# Patient Record
Sex: MALE | Race: BLACK OR AFRICAN AMERICAN | NOT HISPANIC OR LATINO | Employment: OTHER | URBAN - METROPOLITAN AREA
[De-identification: names, ages, dates, MRNs, and addresses within clinical notes are randomized per-mention and may not be internally consistent; named-entity substitution may affect disease eponyms.]

---

## 2017-05-04 ENCOUNTER — HOSPITAL ENCOUNTER (EMERGENCY)
Facility: HOSPITAL | Age: 37
Discharge: HOME | End: 2017-05-04
Attending: EMERGENCY MEDICINE
Payer: MEDICAID

## 2017-05-04 VITALS
DIASTOLIC BLOOD PRESSURE: 76 MMHG | HEART RATE: 70 BPM | RESPIRATION RATE: 18 BRPM | SYSTOLIC BLOOD PRESSURE: 127 MMHG | OXYGEN SATURATION: 98 % | BODY MASS INDEX: 27.47 KG/M2 | HEIGHT: 67 IN | WEIGHT: 175 LBS | TEMPERATURE: 98 F

## 2017-05-04 DIAGNOSIS — S21.212A: Primary | ICD-10-CM
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
## 2017-05-04 DIAGNOSIS — T14.8XXA STAB WOUND: ICD-10-CM

## 2017-05-04 PROCEDURE — 25000003 PHARM REV CODE 250: Performed by: NURSE PRACTITIONER

## 2017-05-04 PROCEDURE — 99284 EMERGENCY DEPT VISIT MOD MDM: CPT

## 2017-05-04 RX ORDER — IBUPROFEN 600 MG/1
600 TABLET ORAL
Status: COMPLETED | OUTPATIENT
Start: 2017-05-04 | End: 2017-05-04

## 2017-05-04 RX ORDER — CEPHALEXIN 500 MG/1
500 CAPSULE ORAL 4 TIMES DAILY
Qty: 20 CAPSULE | Refills: 0 | Status: SHIPPED | OUTPATIENT
Start: 2017-05-04 | End: 2017-05-09

## 2017-05-04 RX ADMIN — IBUPROFEN 600 MG: 600 TABLET ORAL at 07:05
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
## 2017-05-05 NOTE — DISCHARGE INSTRUCTIONS
Old Laceration: Not Sutured  A laceration is a cut through the skin. This will usually require stitches if it is deep. However, if a laceration remains open for too long, the risk of infection increases. In your case, too much time has passed before coming for treatment. The danger of infection from suturing at this time is too high. That is why your wound was not sutured.  If the wound is spread open, it will heal by filling in from the bottom and sides. A wound that is not sutured may take 1 to 4 weeks to heal, depending on the size of the opening. A visible scar will probably occur. You can discuss revision of the scar with your healthcare provider at a later time.  Home care  The following guidelines will help you care for your laceration at home:  · Keep the wound clean and dry. If a bandage was applied and it becomes wet or dirty, replace it. Otherwise, leave it in place for the first 24 hours, then change it once a day or as directed.  · Clean the wound daily:  ¨ After removing any bandage, wash the area with soap and water. Use a wet cotton swab to loosen and remove any blood or crust that forms.  ¨ Talk with your doctor before applying any antibiotic ointment to the wound. Reapply a fresh bandage.  ¨ You may remove the bandage to shower as usual after the first 24 hours, but do not soak the area in water (no tub baths or swimming) for the next five days. Avoid activities that may reinjure your wound.  · The healthcare provider may prescribe an antibiotic cream or ointment to prevent infection. Do not stop using this medication until you have finished the prescribed course or the provider tells you to stop.  · The healthcare provider may also prescribe medications for pain. Follow instructions for taking these medications.  · Check the wound daily for signs of infection listed below.  Follow-up care  Follow up with your healthcare provider as advised.  When to seek medical advice  Call your healthcare  provider right away if any of these occur:  · Wound bleeding not controlled by direct pressure  · Signs of infection, including increasing pain in the wound, increasing wound redness or swelling, or pus or bad odor coming from the wound  · Fever of 100.4°F (38.ºC) or higher or as directed by your healthcare provider  · Wound edges re-open  · Wound changes colors  · Numbness around the wound   · Decreased movement around the injured area  Date Last Reviewed: 6/10/2015  © 9419-7256 [employer]. [address] [place] [address]. All rights reserved. This information is not intended as a substitute for professional medical care. Always follow your healthcare professional's instructions.          Trunk Laceration: All Closures  A laceration is a cut through the skin. Lacerations may be treated with sutures, staples, surgical tape, or skin glue. Treatment will depend on where on the trunk the cut is, when the injury occurred, the cause of the injury, and your risk factors. Minor cuts may be treated with surgical tape or skin glue. Deeper cuts may require sutures or staples for treatment or more than one form of treatment.  Home care  The following guidelines will help you care for your laceration at home:  · Keep the wound clean and dry. If a bandage was applied and it becomes wet or dirty, replace it. Otherwise, leave it in place for the first 24 hours, then change it once a day or as directed.  · If stitches or staples were used, change the bandage daily after the first 24 hours. Each day, look at the wound for any of the warning signs listed below. After removing the bandage, wash the area with soap and water. After cleaning, keep the wound clean and dry. Talk with your doctor about applying antibiotic ointment to the wound. Reapply the bandage. You may remove the bandage and shower as usual after the first 24 hours, but don't soak the area in water until the stitches or staples are removed. This  means no tub baths or swimming.  · If skin glue was used, keep the area clean and dry. If it becomes wet, blot it dry with a towel. Don't scratch, rub, or pick at the adhesive film. Don't place tape directly over the film. Don't apply liquid, ointment or creams to the wound while the film is in place. Don't clean the wound with peroxide and don't apply ointments. Avoid activities that cause heavy sweating until the film has fallen off. Protect the wound from prolonged exposure to sunlight or tanning lamps. You may shower as usual but don't soak the wound in water.  · Your doctor may prescribe an antibiotic cream or ointment, or antibiotic pills. Don't stop using this medicine until you have finished the prescribed course or your doctor tells you to stop. Your doctor may also prescribe medicines for pain. Follow your doctor's instructions for taking these medicines.  Follow-up care  Follow up with your healthcare provider, or as advised. Most skin wounds heal within 5 to 10 days. But your wound may become infected despite proper treatment. Check the wound daily for the signs of infection listed below. Stitches or staples should be removed within 7 to 14 days or as directed. If surgical tape closures were used, you may be told to remove them yourself after 10 days, if they have not fallen off by then. If skin glue was used, the film will fall off by itself in 5 to 10 days.  Call 911  Call 911 if any of these occur:  · Shortness of breath or painful breathing  · Back or abdomen pain that gets worse  · Blood in the stool or urine  · Weakness, dizziness, or fainting.  · Bleeding not controlled by direct pressure  When to seek medical advice  Call your healthcare provider right away if any of these occur:  · Signs of infection, including increasing pain in the wound, redness, swelling, or pus coming from the wound  · Fever of 100.4ºF (38ºC) or higher, or as directed by your healthcare provider  · If stitches or staples  come apart or fall out before your next appointment  · If the surgical tape closures fall off within seven days, or the wound edges re-open  Date Last Reviewed: 10/1/2016  © 7351-2694 [employer]. [address] [place] [address], [place] [address]. All rights reserved. This information is not intended as a substitute for professional medical care. Always follow your healthcare professional's instructions.
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
## 2017-07-27 ENCOUNTER — HOSPITAL ENCOUNTER (EMERGENCY)
Facility: HOSPITAL | Age: 37
Discharge: HOME | End: 2017-07-27
Attending: EMERGENCY MEDICINE
Payer: MEDICAID

## 2017-07-27 VITALS
OXYGEN SATURATION: 99 % | BODY MASS INDEX: 27.47 KG/M2 | TEMPERATURE: 98 F | SYSTOLIC BLOOD PRESSURE: 135 MMHG | HEART RATE: 68 BPM | HEIGHT: 67 IN | WEIGHT: 175 LBS | RESPIRATION RATE: 20 BRPM | DIASTOLIC BLOOD PRESSURE: 82 MMHG

## 2017-07-27 DIAGNOSIS — Z20.5 EXPOSURE TO HEPATITIS C: Primary | ICD-10-CM

## 2017-07-27 PROCEDURE — 99283 EMERGENCY DEPT VISIT LOW MDM: CPT

## 2017-07-27 NOTE — ED NOTES
37 year old male presents to ed with chief complaint of abnormal lab value. Patient gave blood and was contacted saying he was positive for hep c. No chest pain no sob

## 2017-07-27 NOTE — ED NOTES
APPEARANCE: Alert, oriented and in no acute distress.  CARDIAC: Normal rate and rhythm, no murmur heard.   PERIPHERAL VASCULAR: peripheral pulses present. Normal cap refill. No edema. Warm to touch.    RESPIRATORY:Normal rate and effort, breath sounds clear bilaterally throughout chest. Respirations are equal and unlabored no obvious signs of distress.  GASTRO: soft, bowel sounds normal, no tenderness, no abdominal distention.  MUSC: Full ROM. No bony tenderness or soft tissue tenderness. No obvious deformity.  SKIN: Skin is warm and dry, normal skin turgor, mucous membranes moist.  NEURO: 5/5 strength major flexors/extensors bilaterally. Sensory intact to light touch bilaterally. Corning coma scale: eyes open spontaneously-4, oriented & converses-5, obeys commands-6. No neurological abnormalities.   MENTAL STATUS: awake, alert and aware of environment.  EYE: PERRL, both eyes: pupils brisk and reactive to light. Normal size.  ENT: EARS: no obvious drainage. NOSE: no active bleeding.

## 2017-07-27 NOTE — ED PROVIDER NOTES
Encounter Date: 7/27/2017       History     Chief Complaint   Patient presents with    Abnormal Lab     donated blood over one month ago and was contacted by them stating he has hepatitis C, denies any symptoms at this time     The patient was told he had hepatitis C one month ago while donating plasma.  He is here today requesting follow-up.  He denies complaints at this time.  No abdominal pain.  No nausea vomiting.  No weight loss.  No fever.  No jaundice.      The history is provided by the patient.     Review of patient's allergies indicates:  No Known Allergies  History reviewed. No pertinent past medical history.  History reviewed. No pertinent surgical history.  History reviewed. No pertinent family history.  Social History   Substance Use Topics    Smoking status: Current Every Day Smoker     Packs/day: 0.50    Smokeless tobacco: Never Used    Alcohol use Yes     Review of Systems  See HPI    Physical Exam     Initial Vitals [07/27/17 1440]   BP Pulse Resp Temp SpO2   135/82 68 20 98.1 °F (36.7 °C) 99 %      MAP       99.67         Physical Exam    Nursing note and vitals reviewed.  Constitutional: He appears well-developed and well-nourished. He is not diaphoretic. No distress.   Skin:   No jaundice         ED Course   Procedures  Labs Reviewed - No data to display          Medical Decision Making:   Initial Assessment:   The patient does not require further treatment in the emergency department today.  I referred him to family medicine.  I did  the patient regarding using a condom and notify his sexual partners that he may have hepatitis C.                     ED Course     Clinical Impression:   The encounter diagnosis was Exposure to hepatitis C.                           Bro Alcantar MD  07/27/17 4532

## 2017-09-28 ENCOUNTER — OFFICE VISIT (OUTPATIENT)
Dept: FAMILY MEDICINE | Facility: HOSPITAL | Age: 37
End: 2017-09-28
Attending: FAMILY MEDICINE
Payer: MEDICAID

## 2017-09-28 VITALS
DIASTOLIC BLOOD PRESSURE: 59 MMHG | HEIGHT: 67 IN | HEART RATE: 62 BPM | BODY MASS INDEX: 25.96 KG/M2 | WEIGHT: 165.38 LBS | SYSTOLIC BLOOD PRESSURE: 112 MMHG

## 2017-09-28 DIAGNOSIS — Z00.00 WELLNESS EXAMINATION: ICD-10-CM

## 2017-09-28 DIAGNOSIS — B19.20 HEPATITIS C VIRUS INFECTION, UNSPECIFIED CHRONICITY: Primary | ICD-10-CM

## 2017-09-28 PROCEDURE — 99213 OFFICE O/P EST LOW 20 MIN: CPT | Performed by: STUDENT IN AN ORGANIZED HEALTH CARE EDUCATION/TRAINING PROGRAM

## 2017-09-28 PROCEDURE — 90471 IMMUNIZATION ADMIN: CPT

## 2017-09-28 NOTE — PROGRESS NOTES
I assume primary medical responsibility for this patient, I have seen the patient, reviewed the case history, findings, diagnosis and treatment plan with the resident and agree that the care is reasonable and necessary.  Lissa Garvin  9/28/2017
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
## 2017-09-28 NOTE — PROGRESS NOTES
Flu vaccine injection was administered IM. Patient tolerated well. Patient instructed to sit for 15mins before leaving. VIS was given.

## 2017-12-14 ENCOUNTER — OFFICE VISIT (OUTPATIENT)
Dept: FAMILY MEDICINE | Facility: HOSPITAL | Age: 37
End: 2017-12-14
Attending: FAMILY MEDICINE
Payer: MEDICAID

## 2017-12-14 VITALS
SYSTOLIC BLOOD PRESSURE: 118 MMHG | WEIGHT: 163.38 LBS | DIASTOLIC BLOOD PRESSURE: 67 MMHG | HEIGHT: 67 IN | BODY MASS INDEX: 25.64 KG/M2 | HEART RATE: 73 BPM

## 2017-12-14 DIAGNOSIS — B18.2 CHRONIC HEPATITIS C WITHOUT HEPATIC COMA: Primary | ICD-10-CM

## 2017-12-14 PROCEDURE — 99213 OFFICE O/P EST LOW 20 MIN: CPT | Performed by: STUDENT IN AN ORGANIZED HEALTH CARE EDUCATION/TRAINING PROGRAM

## 2017-12-14 NOTE — PROGRESS NOTES
Subjective:       Patient ID: Shayne Ordoñez is a 37 y.o. male.    Chief Complaint: Follow-up and Results    A 36 yo black male came for lab followup. Hep C antibody is positive. He is without any pmx, besides polysubstance abuse THC, no IVD. GI referral was sent to [facility] a month ago but he has not heard anything back yet. His labs look ok, AFP neg, HIV neg, CBC/CMP ok.      Review of Systems   Constitutional: Negative for activity change, chills, fatigue and fever.   HENT: Negative for hearing loss.    Eyes: Negative for visual disturbance.   Respiratory: Negative for cough, chest tightness and shortness of breath.    Cardiovascular: Negative for chest pain, palpitations and leg swelling.   Gastrointestinal: Negative for abdominal distention, abdominal pain, blood in stool, constipation, diarrhea, nausea and vomiting.   Genitourinary: Negative for dysuria.   Neurological: Negative for headaches.       Objective:      Vitals:    12/14/17 1452   BP: 118/67   Pulse: 73     Physical Exam   Constitutional: He is oriented to person, place, and time. He appears well-developed and well-nourished. No distress.   HENT:   Head: Normocephalic and atraumatic.   Nose: Nose normal.   Eyes: Conjunctivae and EOM are normal. No scleral icterus.   Neck: Normal range of motion. Neck supple.   Cardiovascular: Normal rate, regular rhythm and normal heart sounds.  Exam reveals no gallop and no friction rub.    No murmur heard.  Pulmonary/Chest: Effort normal and breath sounds normal. No respiratory distress. He has no wheezes. He has no rales. He exhibits no tenderness.   Abdominal: Soft. Bowel sounds are normal. He exhibits no distension and no mass. There is no tenderness. There is no rebound and no guarding.   Musculoskeletal: Normal range of motion. He exhibits no edema, tenderness or deformity.   Neurological: He is alert and oriented to person, place, and time.   Skin: Skin is warm. He is not diaphoretic.   Psychiatric: He has a  normal mood and affect. His behavior is normal. Judgment and thought content normal.       Assessment:       1. Chronic hepatitis C without hepatic coma        Plan:       Chronic hepatitis C without hepatic coma  -     HEPATITIS C GENOTYPE; Future; Expected date: 12/14/2017  -     HEPATITIS C RNA, QUANTITATIVE, PCR; Future; Expected date: 12/14/2017    Gave referral number and [facility] appt number he will have to call and set up an appointment.     Return in about 2 months (around 2/14/2018) for HepC RNA/viral load/referral followup.

## 2018-07-03 ENCOUNTER — HOSPITAL ENCOUNTER (EMERGENCY)
Facility: HOSPITAL | Age: 38
Discharge: HOME | End: 2018-07-03
Attending: EMERGENCY MEDICINE
Payer: MEDICAID

## 2018-07-03 VITALS
TEMPERATURE: 98 F | RESPIRATION RATE: 18 BRPM | HEIGHT: 67 IN | SYSTOLIC BLOOD PRESSURE: 126 MMHG | BODY MASS INDEX: 25.11 KG/M2 | HEART RATE: 66 BPM | OXYGEN SATURATION: 98 % | DIASTOLIC BLOOD PRESSURE: 71 MMHG | WEIGHT: 160 LBS

## 2018-07-03 DIAGNOSIS — S83.91XA SPRAIN OF RIGHT KNEE, UNSPECIFIED LIGAMENT, INITIAL ENCOUNTER: Primary | ICD-10-CM

## 2018-07-03 PROCEDURE — 96372 THER/PROPH/DIAG INJ SC/IM: CPT

## 2018-07-03 PROCEDURE — 99283 EMERGENCY DEPT VISIT LOW MDM: CPT | Mod: 25

## 2018-07-03 PROCEDURE — 63600175 PHARM REV CODE 636 W HCPCS: Performed by: EMERGENCY MEDICINE

## 2018-07-03 RX ORDER — KETOROLAC TROMETHAMINE 30 MG/ML
30 INJECTION, SOLUTION INTRAMUSCULAR; INTRAVENOUS
Status: COMPLETED | OUTPATIENT
Start: 2018-07-03 | End: 2018-07-03

## 2018-07-03 RX ADMIN — KETOROLAC TROMETHAMINE 30 MG: 30 INJECTION, SOLUTION INTRAMUSCULAR at 01:07

## 2018-07-03 NOTE — ED PROVIDER NOTES
Encounter Date: 7/3/2018       History     Chief Complaint   Patient presents with    Knee Pain     right knee pain x 4 days since using jackhammer on driveway. no swelling, steady gait     38-year-old male presents emergency department complaining of 4 days of right thigh and knee pain. Patient reports he was using a jackhammer and it struck his right knee a few days ago.  Reports he has had an aching pain to both areas since that time.  It is worse with walking without alleviating factors.  No other injury reported.  No other pain reported.  No other symptoms reported.          Review of patient's allergies indicates:  No Known Allergies  History reviewed. No pertinent past medical history.  History reviewed. No pertinent surgical history.  History reviewed. No pertinent family history.  Social History   Substance Use Topics    Smoking status: Current Every Day Smoker     Packs/day: 0.50    Smokeless tobacco: Never Used    Alcohol use Yes     Review of Systems   Constitutional: Negative for chills, fatigue and fever.   HENT: Negative for congestion, sore throat and voice change.    Eyes: Negative for photophobia, pain and redness.   Respiratory: Negative for cough, choking and shortness of breath.    Cardiovascular: Negative for chest pain, palpitations and leg swelling.   Gastrointestinal: Negative for abdominal pain, diarrhea, nausea and vomiting.   Genitourinary: Negative for dysuria, frequency and urgency.   Musculoskeletal: Negative for back pain, neck pain and neck stiffness.   Neurological: Negative for seizures, speech difficulty, light-headedness, numbness and headaches.   All other systems reviewed and are negative.      Physical Exam     Initial Vitals [07/03/18 1238]   BP Pulse Resp Temp SpO2   132/72 66 18 98 °F (36.7 °C) 98 %      MAP       --         Physical Exam    Nursing note and vitals reviewed.  Constitutional: He appears well-developed and well-nourished. No distress.   HENT:   Head:  Normocephalic and atraumatic.   Mouth/Throat: Oropharynx is clear and moist.   Eyes: Conjunctivae and EOM are normal. Pupils are equal, round, and reactive to light.   Neck: Normal range of motion. Neck supple. No tracheal deviation present.   Cardiovascular: Normal rate, regular rhythm, normal heart sounds and intact distal pulses.   Pulmonary/Chest: Breath sounds normal. No respiratory distress. He has no wheezes. He has no rhonchi. He has no rales.   Abdominal: Soft. Bowel sounds are normal. He exhibits no distension. There is no tenderness.   Musculoskeletal: Normal range of motion. He exhibits tenderness. He exhibits no edema.   Mild, diffuse tenderness to the right thigh and knee.  Full range of motion with minimal pain. No gross deformity.  No swelling noted.   Neurological: He is alert and oriented to person, place, and time. He has normal strength. No cranial nerve deficit or sensory deficit.   Skin: Skin is warm and dry. Capillary refill takes less than 2 seconds.         ED Course   Procedures  Labs Reviewed - No data to display       Imaging Results    None          Medical Decision Making:   Initial Assessment:   38-year-old male presents emergency department complaining of right knee pain for 4 days  Differential Diagnosis:   Fracture, dislocation, sprain, strain  ED Management:  Evaluation and exam did not merit imaging.  Informed patient of plan to administer symptomatic medication with home management instructions, Ace wrap, instructions to follow up with primary care physician and/or orthopedist, reasons to return to the emergency room.  Patient is feeling better and comfortable with discharge at this time.                      Clinical Impression:   The encounter diagnosis was Sprain of right knee, unspecified ligament, initial encounter.      Disposition:   Disposition: Discharged  Condition: Stable                        Osvaldo Power MD  07/03/18 0244

## 2018-07-03 NOTE — ED TRIAGE NOTES
Pt arrived in ED with C/O right knee and thigh pain that started approx 4 days ago and progressively gotten worst, pt denies taking anything for pain, a limp favoring the right leg was noticed upon ambulation.

## 2018-07-03 NOTE — DISCHARGE INSTRUCTIONS
After dinner, I recommend you begin taking ibuprofen 600mg every 8 hours with food until your symptoms improve.

## 2018-08-01 ENCOUNTER — OFFICE VISIT (OUTPATIENT)
Dept: FAMILY MEDICINE | Facility: HOSPITAL | Age: 38
End: 2018-08-01
Attending: FAMILY MEDICINE
Payer: MEDICAID

## 2018-08-01 ENCOUNTER — HOSPITAL ENCOUNTER (OUTPATIENT)
Dept: RADIOLOGY | Facility: HOSPITAL | Age: 38
Discharge: HOME | End: 2018-08-01
Attending: STUDENT IN AN ORGANIZED HEALTH CARE EDUCATION/TRAINING PROGRAM
Payer: MEDICAID

## 2018-08-01 VITALS
DIASTOLIC BLOOD PRESSURE: 77 MMHG | WEIGHT: 158.94 LBS | BODY MASS INDEX: 24.94 KG/M2 | HEIGHT: 67 IN | HEART RATE: 59 BPM | SYSTOLIC BLOOD PRESSURE: 117 MMHG

## 2018-08-01 DIAGNOSIS — M22.2X1 PATELLOFEMORAL DISORDER, RIGHT: Primary | ICD-10-CM

## 2018-08-01 DIAGNOSIS — B18.2 CHRONIC HEPATITIS C WITHOUT HEPATIC COMA: ICD-10-CM

## 2018-08-01 DIAGNOSIS — M22.2X1 PATELLOFEMORAL DISORDER, RIGHT: ICD-10-CM

## 2018-08-01 PROCEDURE — 99213 OFFICE O/P EST LOW 20 MIN: CPT | Mod: 25 | Performed by: STUDENT IN AN ORGANIZED HEALTH CARE EDUCATION/TRAINING PROGRAM

## 2018-08-01 PROCEDURE — 73564 X-RAY EXAM KNEE 4 OR MORE: CPT | Mod: 26,RT,, | Performed by: RADIOLOGY

## 2018-08-01 PROCEDURE — 73564 X-RAY EXAM KNEE 4 OR MORE: CPT | Mod: TC,FY,RT

## 2018-08-01 RX ORDER — DICLOFENAC SODIUM 10 MG/G
2 GEL TOPICAL 4 TIMES DAILY
Qty: 1 TUBE | Refills: 2 | Status: SHIPPED | OUTPATIENT
Start: 2018-08-01 | End: 2018-08-16 | Stop reason: SDUPTHER

## 2018-08-01 RX ORDER — LIDOCAINE HCL 4 G/100G
1 CREAM TOPICAL 2 TIMES DAILY PRN
COMMUNITY
Start: 2018-08-01 | End: 2018-08-16 | Stop reason: SDUPTHER

## 2018-08-01 RX ORDER — NAPROXEN 500 MG/1
500 TABLET ORAL 2 TIMES DAILY
Qty: 60 TABLET | Refills: 0 | Status: SHIPPED | OUTPATIENT
Start: 2018-08-01 | End: 2018-08-16 | Stop reason: SDUPTHER

## 2018-08-01 NOTE — PROGRESS NOTES
Subjective:       Patient ID: Shayne Ordoñez is a 38 y.o. male.    Chief Complaint: Injury    HPI     The patient was uisng alfonzo may 3 weeks ago for 2 weeks, and it started to bother him. His right leg and hip, constant, not exacerbated by movement. He feels his pain is at a 9/10, advil bring is down to 7/10. Was playing football in [employer]/high school. He squats 500x. He said his hep c, untreated, blood work ordered last year but he hasn't done it yet.    Review of Systems   Constitutional: Negative for activity change, chills, fatigue and fever.   HENT: Negative for hearing loss.    Eyes: Negative for visual disturbance.   Respiratory: Negative for cough, chest tightness and shortness of breath.    Cardiovascular: Negative for chest pain, palpitations and leg swelling.   Gastrointestinal: Negative for abdominal distention, abdominal pain, blood in stool, constipation, diarrhea, nausea and vomiting.   Genitourinary: Negative for dysuria.   Musculoskeletal: Positive for arthralgias and myalgias. Negative for gait problem and joint swelling.   Skin: Negative for wound.   Neurological: Negative for headaches.         Objective:      Vitals:    08/01/18 0942   BP: 117/77   Pulse: (!) 59     Physical Exam   Constitutional: He is oriented to person, place, and time. He appears well-developed and well-nourished. No distress.   HENT:   Head: Normocephalic and atraumatic.   Nose: Nose normal.   Eyes: Conjunctivae and EOM are normal. No scleral icterus.   Neck: Normal range of motion. Neck supple.   Cardiovascular: Normal rate, regular rhythm and normal heart sounds.  Exam reveals no gallop and no friction rub.    No murmur heard.  Pulmonary/Chest: Effort normal and breath sounds normal. No respiratory distress. He has no wheezes. He has no rales. He exhibits no tenderness.   Abdominal: Soft. Bowel sounds are normal. He exhibits no distension and no mass. There is no tenderness. There is no rebound and no guarding.    Musculoskeletal: Normal range of motion. He exhibits tenderness. He exhibits no edema or deformity.   Squeeze sign positive +  Tenderness at suprapatella  Lachman/anterior sign negative  No laxity  Good ROM  Strength 5/5  Pulse+ bila  Sensation intact.   Neurological: He is alert and oriented to person, place, and time.   Skin: Skin is warm. He is not diaphoretic.   Psychiatric: He has a normal mood and affect. His behavior is normal. Judgment and thought content normal.       Assessment:       1. Patellofemoral disorder, right    2. Chronic hepatitis C without hepatic coma        Plan:       Patellofemoral disorder, right  -     naproxen (NAPROSYN) 500 MG tablet; Take 1 tablet (500 mg total) by mouth 2 (two) times daily.  Dispense: 60 tablet; Refill: 0  -     diclofenac sodium (VOLTAREN) 1 % Gel; Apply 2 g topically 4 (four) times daily. for 10 days  Dispense: 1 Tube; Refill: 2  -     lidocaine HCl (ASPERCREME, LIDOCAINE,) 4 % Crea; Apply 1 Tube topically 2 (two) times daily as needed.  -     X-Ray Knee Complete 4 Or More Views Right; Future; Expected date: 08/01/2018    Chronic hepatitis C without hepatic coma    Hep C untreated pt reported, hepc genotype and viral load sent.    Follow-up in about 4 weeks (around 8/29/2018).   Advised not to squat in the next few weeks, the knee cap enters the quads and cause friction for patellofemoral syndrome      Will consider Physical therapy and/or steroid shots, if either doesn't work, ortho referral.

## 2018-08-16 ENCOUNTER — OFFICE VISIT (OUTPATIENT)
Dept: FAMILY MEDICINE | Facility: HOSPITAL | Age: 38
End: 2018-08-16
Attending: FAMILY MEDICINE
Payer: MEDICAID

## 2018-08-16 VITALS
HEART RATE: 69 BPM | HEIGHT: 67 IN | WEIGHT: 160.5 LBS | BODY MASS INDEX: 25.19 KG/M2 | SYSTOLIC BLOOD PRESSURE: 113 MMHG | DIASTOLIC BLOOD PRESSURE: 70 MMHG

## 2018-08-16 DIAGNOSIS — M22.2X1 PATELLOFEMORAL PAIN SYNDROME OF RIGHT KNEE: Primary | ICD-10-CM

## 2018-08-16 DIAGNOSIS — Z82.0 FAMILY HISTORY OF MOVEMENT DISORDER: ICD-10-CM

## 2018-08-16 DIAGNOSIS — B18.2 CHRONIC HEPATITIS C WITHOUT HEPATIC COMA: ICD-10-CM

## 2018-08-16 PROCEDURE — 99214 OFFICE O/P EST MOD 30 MIN: CPT | Performed by: STUDENT IN AN ORGANIZED HEALTH CARE EDUCATION/TRAINING PROGRAM

## 2018-08-16 RX ORDER — DICLOFENAC SODIUM 10 MG/G
2 GEL TOPICAL 4 TIMES DAILY
Qty: 1 TUBE | Refills: 2 | Status: SHIPPED | OUTPATIENT
Start: 2018-08-16 | End: 2018-10-08

## 2018-08-16 RX ORDER — NAPROXEN 500 MG/1
500 TABLET ORAL 2 TIMES DAILY
Qty: 60 TABLET | Refills: 0 | Status: SHIPPED | OUTPATIENT
Start: 2018-08-16 | End: 2018-10-08

## 2018-08-16 RX ORDER — LIDOCAINE HCL 4 G/100G
1 CREAM TOPICAL 2 TIMES DAILY PRN
COMMUNITY
Start: 2018-08-16 | End: 2018-10-08

## 2018-08-16 NOTE — PROGRESS NOTES
Subjective:       Patient ID: Shayne Ordoñez is a 38 y.o. male.    Chief Complaint: Leg Pain    HPI   The patient reported his leg pain mostly on right has improved with topical cream and naproxyn. He has no GI side effect. He said he is trying to fill out paper work for disability because of his knees. He also told me his mother and maternal grandparent had Kory's disease and describe them having flapping, involuntary jerking in their extremities and both passed around the age of 50. He is the oldest child and he has not having any symptoms right now, and his siblings report no movement disorder but he would like to be checked.    Review of Systems   Constitutional: Negative for activity change, chills, fatigue and fever.   HENT: Negative for hearing loss.    Eyes: Negative for visual disturbance.   Respiratory: Negative for cough, chest tightness and shortness of breath.    Cardiovascular: Negative for chest pain, palpitations and leg swelling.   Gastrointestinal: Negative for abdominal distention, abdominal pain, blood in stool, constipation, diarrhea, nausea and vomiting.   Genitourinary: Negative for difficulty urinating and dysuria.   Musculoskeletal: Positive for arthralgias and myalgias. Negative for gait problem and joint swelling.   Skin: Negative for wound.   Neurological: Negative for dizziness and headaches.   Psychiatric/Behavioral: Negative for agitation and decreased concentration.         Objective:      Vitals:    08/16/18 1531   BP: 113/70   Pulse: 69     Physical Exam   Constitutional: He is oriented to person, place, and time. He appears well-developed and well-nourished. No distress.   HENT:   Head: Normocephalic and atraumatic.   Nose: Nose normal.   Eyes: Conjunctivae and EOM are normal. No scleral icterus.   Neck: Normal range of motion. Neck supple.   Cardiovascular: Normal rate, regular rhythm and normal heart sounds. Exam reveals no gallop and no friction rub.   No murmur  heard.  Pulmonary/Chest: Effort normal and breath sounds normal. No respiratory distress. He has no wheezes. He has no rales. He exhibits no tenderness.   Abdominal: Soft. Bowel sounds are normal. He exhibits no distension and no mass. There is no tenderness. There is no rebound and no guarding.   Musculoskeletal: Normal range of motion. He exhibits tenderness. He exhibits no edema or deformity.   Squeeze sign positive +  Tenderness at suprapatella  Lachman/anterior sign negative  No laxity  Good ROM  Strength 5/5  Pulse+ bila  Sensation intact.   Neurological: He is alert and oriented to person, place, and time.   Skin: Skin is warm. He is not diaphoretic.   Psychiatric: He has a normal mood and affect. His behavior is normal. Judgment and thought content normal.       Assessment:       1. Patellofemoral pain syndrome of right knee    2. Chronic hepatitis C without hepatic coma    3. Family history of movement disorder        Plan:       Patellofemoral pain syndrome of right knee  -     diclofenac sodium (VOLTAREN) 1 % Gel; Apply 2 g topically 4 (four) times daily. for 10 days  Dispense: 1 Tube; Refill: 2  -     naproxen (NAPROSYN) 500 MG tablet; Take 1 tablet (500 mg total) by mouth 2 (two) times daily.  Dispense: 60 tablet; Refill: 0  -     lidocaine HCl (ASPERCREME, LIDOCAINE,) 4 % Crea; Apply 1 Tube topically 2 (two) times daily as needed.    Chronic hepatitis C without hepatic coma  -     HCV FIBROSURE; Future; Expected date: 08/16/2018  -     Cancel: Ambulatory referral to Gastroenterology  -     Ambulatory Referral to Hepatology    Family history of movement disorder  -     Ambulatory referral to Neurology      Follow-up in about 1 year (around 8/16/2019).    questionable hx of self-reported Kory's dz in a asymptomatic patient. He will be seen by a hepatologist to evaluate the treatment for his hepC, fibrosure ordered in the meantime.  Patient is asking for further eval of Huntingtons.

## 2018-08-19 ENCOUNTER — DOCUMENTATION ONLY (OUTPATIENT)
Dept: TRANSPLANT | Facility: CLINIC | Age: 38
End: 2018-08-19

## 2018-08-19 NOTE — PROGRESS NOTES
Pt records reviewed.   Pt will be referred to Hepatitis C clinic    Initial referral received  from the [facility]que.   Referring Provider/diagnosis  CONNIE LUIS Provider:   Diagnosis: Chronic hepatitis C without hepatic coma         Referral letter sent to  patient.

## 2018-08-19 NOTE — LETTER
August 19, 2018    Shayne Ordoñez  [address] [facility] [address]      Dear Shayne Ordoñez:    Your doctor has referred you to the [facility]. We are sending this letter as a reminder for you to make an appointment with us to complete the referral process.   Please call us at your earliest convenience at [phone] to schedule your appointment.  We look forward to seeing you soon.  If you received a call, and are scheduled, please disregard this letter.      Sincerely,        [facility]   [address] [place] [id number]  [phone]

## 2018-08-20 ENCOUNTER — TELEPHONE (OUTPATIENT)
Dept: HEPATOLOGY | Facility: CLINIC | Age: 38
End: 2018-08-20

## 2018-08-20 NOTE — TELEPHONE ENCOUNTER
----- Message from Kayla Mccarthy sent at 8/20/2018 11:10 AM CDT -----  Contact: patient  Needs Advice    Reason for call: Would like to reschedule appt to 9/4/18 if possible.       Communication Preference: [phone]  Additional Information: n/a

## 2018-08-20 NOTE — PROGRESS NOTES
I have reviewed the notes, assessments, and/or procedures performed, I concur with her/his documentation of Shayne Ordoñez.

## 2018-08-26 NOTE — PROGRESS NOTES
Case discussed with resident at time of visit.  I have reviewed and concur with the resident's evaluation, assessment, and plan.  Presents with CC of R leg pain.  Xrays ordered.  Rx NSAID's.

## 2018-09-04 ENCOUNTER — INITIAL CONSULT (OUTPATIENT)
Dept: HEPATOLOGY | Facility: CLINIC | Age: 38
End: 2018-09-04
Payer: MEDICAID

## 2018-09-04 ENCOUNTER — LAB VISIT (OUTPATIENT)
Dept: LAB | Facility: HOSPITAL | Age: 38
End: 2018-09-04
Payer: MEDICAID

## 2018-09-04 VITALS
DIASTOLIC BLOOD PRESSURE: 73 MMHG | OXYGEN SATURATION: 99 % | WEIGHT: 159.63 LBS | SYSTOLIC BLOOD PRESSURE: 115 MMHG | BODY MASS INDEX: 25.06 KG/M2 | TEMPERATURE: 97 F | HEART RATE: 74 BPM | RESPIRATION RATE: 18 BRPM | HEIGHT: 67 IN

## 2018-09-04 DIAGNOSIS — B18.2 CHRONIC HEPATITIS C WITHOUT HEPATIC COMA: ICD-10-CM

## 2018-09-04 DIAGNOSIS — B18.2 CHRONIC HEPATITIS C WITHOUT HEPATIC COMA: Primary | ICD-10-CM

## 2018-09-04 LAB
ALBUMIN SERPL BCP-MCNC: 4 G/DL
ALP SERPL-CCNC: 55 U/L
ALT SERPL W/O P-5'-P-CCNC: 44 U/L
ANION GAP SERPL CALC-SCNC: 8 MMOL/L
AST SERPL-CCNC: 32 U/L
BASOPHILS # BLD AUTO: 0.03 K/UL
BASOPHILS NFR BLD: 0.5 %
BILIRUB SERPL-MCNC: 0.6 MG/DL
BUN SERPL-MCNC: 25 MG/DL
CALCIUM SERPL-MCNC: 9.5 MG/DL
CHLORIDE SERPL-SCNC: 106 MMOL/L
CO2 SERPL-SCNC: 26 MMOL/L
CREAT SERPL-MCNC: 1 MG/DL
DIFFERENTIAL METHOD: NORMAL
EOSINOPHIL # BLD AUTO: 0.2 K/UL
EOSINOPHIL NFR BLD: 4 %
ERYTHROCYTE [DISTWIDTH] IN BLOOD BY AUTOMATED COUNT: 12.4 %
EST. GFR  (AFRICAN AMERICAN): >60 ML/MIN/1.73 M^2
EST. GFR  (NON AFRICAN AMERICAN): >60 ML/MIN/1.73 M^2
GLUCOSE SERPL-MCNC: 89 MG/DL
HBV CORE AB SERPL QL IA: NEGATIVE
HBV SURFACE AB SER-ACNC: NEGATIVE M[IU]/ML
HBV SURFACE AG SERPL QL IA: NEGATIVE
HCT VFR BLD AUTO: 42.1 %
HEPATITIS A ANTIBODY, IGG: NEGATIVE
HGB BLD-MCNC: 14 G/DL
IMM GRANULOCYTES # BLD AUTO: 0.01 K/UL
IMM GRANULOCYTES NFR BLD AUTO: 0.2 %
INR PPP: 1
LYMPHOCYTES # BLD AUTO: 1.7 K/UL
LYMPHOCYTES NFR BLD: 30.6 %
MCH RBC QN AUTO: 29.7 PG
MCHC RBC AUTO-ENTMCNC: 33.3 G/DL
MCV RBC AUTO: 89 FL
MONOCYTES # BLD AUTO: 0.6 K/UL
MONOCYTES NFR BLD: 10.2 %
NEUTROPHILS # BLD AUTO: 3.1 K/UL
NEUTROPHILS NFR BLD: 54.5 %
NRBC BLD-RTO: 0 /100 WBC
PLATELET # BLD AUTO: 273 K/UL
PMV BLD AUTO: 9.4 FL
POTASSIUM SERPL-SCNC: 4.2 MMOL/L
PROT SERPL-MCNC: 7.1 G/DL
PROTHROMBIN TIME: 10.1 SEC
RBC # BLD AUTO: 4.71 M/UL
SODIUM SERPL-SCNC: 140 MMOL/L
WBC # BLD AUTO: 5.69 K/UL

## 2018-09-04 PROCEDURE — 86706 HEP B SURFACE ANTIBODY: CPT

## 2018-09-04 PROCEDURE — 85025 COMPLETE CBC W/AUTO DIFF WBC: CPT

## 2018-09-04 PROCEDURE — 85610 PROTHROMBIN TIME: CPT

## 2018-09-04 PROCEDURE — 99999 PR PBB SHADOW E&M-EST. PATIENT-LVL IV: CPT | Mod: PBBFAC,,, | Performed by: PHYSICIAN ASSISTANT

## 2018-09-04 PROCEDURE — 86704 HEP B CORE ANTIBODY TOTAL: CPT

## 2018-09-04 PROCEDURE — 87340 HEPATITIS B SURFACE AG IA: CPT

## 2018-09-04 PROCEDURE — 82977 ASSAY OF GGT: CPT

## 2018-09-04 PROCEDURE — 80053 COMPREHEN METABOLIC PANEL: CPT

## 2018-09-04 PROCEDURE — 36415 COLL VENOUS BLD VENIPUNCTURE: CPT

## 2018-09-04 PROCEDURE — 99214 OFFICE O/P EST MOD 30 MIN: CPT | Mod: PBBFAC | Performed by: PHYSICIAN ASSISTANT

## 2018-09-04 PROCEDURE — 86790 VIRUS ANTIBODY NOS: CPT

## 2018-09-04 PROCEDURE — 99203 OFFICE O/P NEW LOW 30 MIN: CPT | Mod: S$PBB,,, | Performed by: PHYSICIAN ASSISTANT

## 2018-09-04 NOTE — LETTER
September 4, 2018      Luci Siegel MD  [address] [place] [address]  [address]  [facility] [address]           Magdy Thrasher - Hepatitis C  [address]  [facility] [address]  Phone: [phone]  Fax: [phone]          Patient: Shayne Ordoñez   MR Number: [id number]   YOB: 1980   Date of Visit: 9/4/2018       Dear Dr. Luci Siegel:    Thank you for referring Shayne Ordoñez to me for evaluation. Attached you will find relevant portions of my assessment and plan of care.    If you have questions, please do not hesitate to call me. I look forward to following Shayne Ordoñez along with you.    Sincerely,    Jennifer B. Scheuermann, PA    Enclosure  CC:  No Recipients    If you would like to receive this communication electronically, please contact [email or web address] or [phone] to request more information on [facility] Link access.    For providers and/or their staff who would like to refer a patient to [facility], please contact us through our one-stop-shop provider referral line, [facility], at [phone].    If you feel you have received this communication in error or would no longer like to receive these types of communications, please e-mail [email or web address]

## 2018-09-04 NOTE — PROGRESS NOTES
HEPATOLOGY CLINIC VISIT NOTE - HCV clinic    REFERRING PROVIDER: Luci Siegel MD    CHIEF COMPLAINT: Hepatitis C   (here w/ aunt)    HISTORY: This is a 38 y.o. Black or  male with chronic hepatitis C, here for further eval / mngmt.     HCV history:  Originally diagnosed w/ HCV following plasma donation ~ 1 year ago.  Notes he had been donating regularly for ~ 2 months before he was notified of positive result.    Risks for HCV: Tattoo placement - first one age 15    Prior senior care time  - tattoos placed while in senior care    Unprotected sex - reports this was only risk factor occurring around time of his diagnosis    No IV or nasal or blood transfusions    - Treatment naive  - Genotype 1a  - NS5A resistance not known  - HCV RNA 5,122,327 IU/mL - 2018    Liver staging:  No formal liver staging  No liver imaging    Labs 10/2017 reveal no evidence of advanced fibrosis   - normal plt > 260   - normal TBili, Albumin   - AST 29, ALT 43      Denies jaundice, dark urine, abdominal distention, hematemesis, melena, slowed mentation.   No abnormal skin rashes. No generalized joint pain.     Other:   10/20/2017 09:06   Hepatitis B Surface Ag Negative      10/20/2017 09:06   HIV 1/2 Ag/Ab Negative                       No past medical history on file.    No past surgical history on file.      FAMILY HISTORY:   Father - cirrhosis due to alcohol    SOCIAL HISTORY:   Social History     Tobacco Use   Smoking Status Current Every Day Smoker    Packs/day: 0.10    Types: Cigarettes    Last attempt to quit: 2018    Years since quittin.1   Smokeless Tobacco Never Used       Alcohol - 6 month period of daily alcohol in the past. Now only drinks at holiday gatherings w/ family    Drugs - denies        ROS:   No fever, chills, weight loss, fatigue  No chest pain, palpitations, dyspnea, cough  No abdominal pain, change in bowel pattern, nausea, vomiting, GERD  No dysuria, hematuria   No skin rashes   No  headaches  (+) Knee pain - right  No lower extremity edema  No depression or anxiety      PHYSICAL EXAM:  Friendly Black or  male, in no acute distress; alert and oriented to person, place and time  VITALS: reviewed  HEENT: Sclerae anicteric.   NECK: Supple  CVS: Regular rate and rhythm. No murmurs  LUNGS: Normal respiratory effort. Clear bilaterally  ABDOMEN: Flat, soft, nontender. No organomegaly or masses. No ascites or hernias. Good bowel sounds.    SKIN: Warm and dry. No jaundice, No obvious rashes. (+) tattoos  EXTREMITIES: No lower extremity edema  NEURO/PSYCH: Ambulates w/ limp. Memory intact. Thought and speech pattern appropriate. Behavior normal. No depression or anxiety noted.    RECENT LABS:  Lab Results   Component Value Date    WBC 3.62 (L) 10/20/2017    HGB 14.1 10/20/2017     10/20/2017     No results found for: INR  Lab Results   Component Value Date    AST 29 10/20/2017    ALT 43 10/20/2017    BILITOT 0.4 10/20/2017    ALBUMIN 3.6 10/20/2017    ALKPHOS 53 (L) 10/20/2017    CREATININE 0.7 10/20/2017    BUN 8 10/20/2017     10/20/2017    K 3.8 10/20/2017    AFP 3.3 10/20/2017         RECENT IMAGING:  none    ASSESSMENT  38 y.o. Black or  male with:  1. CHRONIC HEPATITIS C, GENOTYPE 1a - treatment naive  -- Unknown Immunity to HAV & HBV      EDUCATION:  The natural history of Hepatitis C, including potential progression to cirrhosis was reviewed.   We discussed the increased progression of liver disease secondary to alcohol use; patient was advised to avoid alcohol completely.     Transmission of Hepatitis C was reviewed, including possible sexual transmission. Sexual contacts should be screened. Patient should avoid sharing personal products such as razors, toothbrushes, etc.     Recommend avoiding raw seafood.  Limit acetaminophen to 2000mg daily.          PLAN:  1. Labs & imaging  Orders Placed This Encounter   Procedures    US Abdomen Complete    US  Elastography Liver    CBC auto differential    Comprehensive metabolic panel    Protime-INR    Hepatitis B surface antigen    Hepatitis B surface antibody    Hepatitis B core antibody, total    Hepatitis A antibody, IgG       2. F/u visit w/ goal of antiviral therapy

## 2018-09-05 LAB
A2 MACROGLOB SERPL-MCNC: 195 MG/DL
ALT SERPL W P-5'-P-CCNC: 48 U/L (ref 7–55)
APO A-I SERPL-MCNC: 164 MG/DL
BILIRUB SERPL-MCNC: 0.5 MG/DL
BIOPREDICTIVE SERIAL NUMBER: NORMAL
FIBROSIS STAGE SERPL QL: NORMAL
FIBROTEST INTERPRETATION: NORMAL
FIBROTEST-ACTITEST COMMENT: NORMAL
GGT SERPL-CCNC: 52 U/L
HAPTOGLOB SERPL-MCNC: 68 MG/DL
LIVER FIBR SCORE SERPL CALC.FIBROSURE: 0.23
NECROINFLAMMAT INTERP: NORMAL
NECROINFLAMMATORY ACT GRADE SERPL QL: NORMAL
NECROINFLAMMATORY ACT SCORE SERPL: 0.26

## 2018-09-07 ENCOUNTER — OFFICE VISIT (OUTPATIENT)
Dept: FAMILY MEDICINE | Facility: HOSPITAL | Age: 38
End: 2018-09-07
Attending: FAMILY MEDICINE
Payer: MEDICAID

## 2018-09-07 VITALS
SYSTOLIC BLOOD PRESSURE: 117 MMHG | DIASTOLIC BLOOD PRESSURE: 70 MMHG | BODY MASS INDEX: 25.57 KG/M2 | WEIGHT: 162.94 LBS | HEART RATE: 68 BPM | HEIGHT: 67 IN

## 2018-09-07 DIAGNOSIS — Z82.0 FAMILY HISTORY OF NEUROLOGICAL DISORDER: ICD-10-CM

## 2018-09-07 DIAGNOSIS — M22.2X2 PATELLOFEMORAL PAIN SYNDROME OF BOTH KNEES: Primary | ICD-10-CM

## 2018-09-07 DIAGNOSIS — B19.20 HEPATITIS C VIRUS INFECTION WITHOUT HEPATIC COMA, UNSPECIFIED CHRONICITY: ICD-10-CM

## 2018-09-07 DIAGNOSIS — M22.2X1 PATELLOFEMORAL PAIN SYNDROME OF BOTH KNEES: Primary | ICD-10-CM

## 2018-09-07 PROCEDURE — 99213 OFFICE O/P EST LOW 20 MIN: CPT | Performed by: STUDENT IN AN ORGANIZED HEALTH CARE EDUCATION/TRAINING PROGRAM

## 2018-09-07 NOTE — PROGRESS NOTES
Subjective:       Patient ID: Shayne Ordoñez is a 38 y.o. male.    Chief Complaint: Knee Pain    HPI   The pt asking if I can help sign his disability paper or provide a letter saying he's not able to go back to work because of his knee problem. I declined because we cannot do that in our clinic, only social security and disability agency can do it they have their own doctors to do it. The patient still reported he has a strong fmhx of huntingtons  and has not seen a neurologist, his phone was off last week.       Review of Systems    Constitutional: Negative for activity change, chills, fatigue and fever.   HENT: Negative for hearing loss.    Eyes: Negative for visual disturbance.   Respiratory: Negative for cough, chest tightness and shortness of breath.    Cardiovascular: Negative for chest pain, palpitations and leg swelling.   Gastrointestinal: Negative for abdominal distention, abdominal pain, blood in stool, constipation, diarrhea, nausea and vomiting.   Genitourinary: Negative for difficulty urinating and dysuria.   Musculoskeletal: Positive for arthralgias and myalgias. Negative for gait problem and joint swelling.   Skin: Negative for wound.   Neurological: Negative for dizziness and headaches.   Psychiatric/Behavioral: Negative for agitation and decreased concentration.   Objective:      Vitals:    09/07/18 1117   BP: 117/70   Pulse: 68     Physical Exam    Constitutional: He is oriented to person, place, and time. He appears well-developed and well-nourished. No distress.   HENT:   Head: Normocephalic and atraumatic.   Nose: Nose normal.   Eyes: Conjunctivae and EOM are normal. No scleral icterus.   Neck: Normal range of motion. Neck supple.   Cardiovascular: Normal rate, regular rhythm and normal heart sounds. Exam reveals no gallop and no friction rub.   No murmur heard.  Pulmonary/Chest: Effort normal and breath sounds normal. No respiratory distress. He has no wheezes. He has no rales. He exhibits  no tenderness.   Abdominal: Soft. Bowel sounds are normal. He exhibits no distension and no mass. There is no tenderness. There is no rebound and no guarding.   Musculoskeletal: Normal range of motion. He exhibits tenderness. He exhibits no edema or deformity.   Squeeze sign positive +  Tenderness at suprapatella  Lachman/anterior sign negative  No laxity  Good ROM  Strength 5/5  Pulse+ bila  Sensation intact.   Neurological: He is alert and oriented to person, place, and time.   Skin: Skin is warm. He is not diaphoretic.   Psychiatric: He has a normal mood and affect. His behavior is normal. Judgment and thought content normal.   Assessment:       1. Patellofemoral pain syndrome of both knees    2. Hepatitis C virus infection without hepatic coma, unspecified chronicity    3. Family history of neurological disorder        Plan:       Patellofemoral pain syndrome of both knees  Continue topical cream and naprosyn, improving    Hepatitis C virus infection without hepatic coma, unspecified chronicity  Has been seen by the hep c clinic  Will follow up accordingly.  Fibrosure scan ordered and resulted.    Family history of neurological disorder  Referred to neurology for further work up  Will talk to scheduling Ms. Trinity Bosch.    Follow-up in about 1 year (around 9/7/2019).    Neurology work up and knee pain.

## 2018-10-08 ENCOUNTER — TELEPHONE (OUTPATIENT)
Dept: PHARMACY | Facility: CLINIC | Age: 38
End: 2018-10-08

## 2018-10-08 ENCOUNTER — PROCEDURE VISIT (OUTPATIENT)
Dept: HEPATOLOGY | Facility: CLINIC | Age: 38
End: 2018-10-08
Attending: PHYSICIAN ASSISTANT
Payer: MEDICAID

## 2018-10-08 ENCOUNTER — OFFICE VISIT (OUTPATIENT)
Dept: HEPATOLOGY | Facility: CLINIC | Age: 38
End: 2018-10-08
Payer: MEDICAID

## 2018-10-08 ENCOUNTER — HOSPITAL ENCOUNTER (OUTPATIENT)
Dept: RADIOLOGY | Facility: HOSPITAL | Age: 38
Discharge: HOME | End: 2018-10-08
Attending: PHYSICIAN ASSISTANT
Payer: MEDICAID

## 2018-10-08 VITALS
RESPIRATION RATE: 18 BRPM | SYSTOLIC BLOOD PRESSURE: 120 MMHG | OXYGEN SATURATION: 98 % | HEART RATE: 86 BPM | BODY MASS INDEX: 25.53 KG/M2 | TEMPERATURE: 98 F | DIASTOLIC BLOOD PRESSURE: 76 MMHG | WEIGHT: 162.69 LBS | HEIGHT: 67 IN

## 2018-10-08 DIAGNOSIS — B18.2 CHRONIC HEPATITIS C WITHOUT HEPATIC COMA: ICD-10-CM

## 2018-10-08 DIAGNOSIS — B18.2 CHRONIC HEPATITIS C WITHOUT HEPATIC COMA: Primary | ICD-10-CM

## 2018-10-08 PROCEDURE — 76700 US EXAM ABDOM COMPLETE: CPT | Mod: 26,,, | Performed by: RADIOLOGY

## 2018-10-08 PROCEDURE — 91200 LIVER ELASTOGRAPHY: CPT | Mod: 26,S$PBB,, | Performed by: PHYSICIAN ASSISTANT

## 2018-10-08 PROCEDURE — 99999 PR PBB SHADOW E&M-EST. PATIENT-LVL IV: CPT | Mod: PBBFAC,,, | Performed by: PHYSICIAN ASSISTANT

## 2018-10-08 PROCEDURE — 99214 OFFICE O/P EST MOD 30 MIN: CPT | Mod: PBBFAC,25 | Performed by: PHYSICIAN ASSISTANT

## 2018-10-08 PROCEDURE — 99213 OFFICE O/P EST LOW 20 MIN: CPT | Mod: S$PBB,,, | Performed by: PHYSICIAN ASSISTANT

## 2018-10-08 PROCEDURE — 91200 LIVER ELASTOGRAPHY: CPT | Mod: PBBFAC | Performed by: PHYSICIAN ASSISTANT

## 2018-10-08 PROCEDURE — 76700 US EXAM ABDOM COMPLETE: CPT | Mod: TC

## 2018-10-08 NOTE — PATIENT INSTRUCTIONS
Labs today  Hepatitis A and B vaccine shot today  Wait to hear from [facility] about Mavyret (HCV medicine)

## 2018-10-08 NOTE — PROGRESS NOTES
HEPATOLOGY CLINIC VISIT NOTE - HCV clinic    CHIEF COMPLAINT: Hepatitis C     HISTORY: This is a 38 y.o. Black or  male with chronic hepatitis C, here for f/u w/ additional labs, imaging, liver staging    U/S today pending  FibroScan indicates minimal fibrosis  Lacking immunity to HAV/HBV    Feels well  Motivated to have HCV treated  Denies jaundice, dark urine, hematemesis, melena, slowed mentation, abdominal distention.       HCV history:  Originally diagnosed w/ HCV following plasma donation ~ 1 year ago.  Notes he had been donating regularly for ~ 2 months before he was notified of positive result.    Risks for HCV: Tattoo placement - first one age 15    Prior senior living time  - tattoos placed while in senior living    Unprotected sex - reports this was only risk factor occurring around time of his diagnosis    No IV or nasal or blood transfusions    - Treatment naive  - Genotype 1a  - NS5A resistance not known  - HCV RNA 5,122,327 IU/mL - 2018    Liver staging:  FibroScan today 10/8/18 - kPa 4.8, F0-1 (, no significant steatosis)  HCV FibroSure 2018 - A0-1, F0-1    U/S pending  Labs support FibroScan findings      Past Medical History:   Diagnosis Date    Chronic hepatitis C        No past surgical history on file.      FAMILY HISTORY:   Father - cirrhosis due to alcohol    SOCIAL HISTORY:   Social History     Tobacco Use   Smoking Status Current Every Day Smoker    Packs/day: 0.10    Types: Cigarettes    Last attempt to quit: 2018    Years since quittin.2   Smokeless Tobacco Never Used       Alcohol - 6 month period of daily alcohol in the past. Now only drinks at holiday gatherings w/ family. No alcohol abuse    Drugs - denies        ROS:   No fever, chills, weight loss, fatigue  No chest pain, palpitations, dyspnea, cough  No abdominal pain, nausea, vomiting  No skin rashes   No headaches  No lower extremity edema  No depression or anxiety      PHYSICAL EXAM:  Friendly Black or   male, in no acute distress; alert and oriented to person, place and time  VITALS: reviewed  HEENT: Sclerae anicteric.   NECK: Supple  LUNGS: Normal respiratory effort.   ABDOMEN: Flat, soft, nontender.   SKIN: Warm and dry. No jaundice, No obvious rashes. (+) tattoos  EXTREMITIES: No lower extremity edema  NEURO/PSYCH: Ambulates w/ limp. Memory intact. Thought and speech pattern appropriate. Behavior normal. No depression or anxiety noted.    RECENT LABS:  Lab Results   Component Value Date    WBC 5.69 09/04/2018    HGB 14.0 09/04/2018     09/04/2018     Lab Results   Component Value Date    INR 1.0 09/04/2018     Lab Results   Component Value Date    AST 32 09/04/2018    ALT 44 09/04/2018    BILITOT 0.6 09/04/2018    ALBUMIN 4.0 09/04/2018    ALKPHOS 55 09/04/2018    CREATININE 1.0 09/04/2018    BUN 25 (H) 09/04/2018     09/04/2018    K 4.2 09/04/2018    AFP 3.3 10/20/2017         RECENT IMAGING:  U/S today pending    ASSESSMENT  38 y.o. Black or  male with:  1. CHRONIC HEPATITIS C, GENOTYPE 1a - treatment naive  -- F0-1 on FibroSURE and FibroScan  -- Lacking Immunity to HAV & HBV      EDUCATION:  Discussed goal of HCV eradication to prevent progression of liver disease.  Discussed use of Mavyret (3 pills daily with food) x 8 weeks with potential side effects of fatigue, headache, nausea, diarrhea.     Discussed potential for drug interactions with Mavyret.  Current med list reviewed - no interactions noted.  Pt to contact me if new medicines are prescribed.  Herbal / alternative therapies must be avoided.    Discussed importance of medication adherence and risk of treatment failure / viral resistance if not adherent. Pt has verbalized understanding.        PLAN:  1. Labs   Orders Placed This Encounter   Procedures    HIV-1 and HIV-2 antibodies   2. Twinrix sent to [facility]  3. Obtain authorization to treat HCV with Mavyret x 8 weeks  -- [facility]  -- Patient will notify me of exact treatment start date so appropriate lab f/u can be scheduled.  4. Await pending U/S results

## 2018-10-08 NOTE — PROCEDURES
Procedures   Fibroscan Procedure     Name: Shayne Ordoñez  Date of Procedure : 10/08/2018   :: Jennifer B Scheuermann, PA  Diagnosis: HCV    Probe: M    Fibroscan readin.8 KPa    Fibrosis:F 0-1     CAP readin dB/m    Steatosis: :<S1

## 2018-10-09 ENCOUNTER — TELEPHONE (OUTPATIENT)
Dept: HEPATOLOGY | Facility: CLINIC | Age: 38
End: 2018-10-09

## 2018-10-09 NOTE — TELEPHONE ENCOUNTER
----- Message from Jennifer B. Scheuermann, PA sent at 10/9/2018  2:20 PM CDT -----  pls tell pt HIV screen negative

## 2018-10-09 NOTE — TELEPHONE ENCOUNTER
----- Message from Jennifer B. Scheuermann, PA sent at 10/9/2018  2:20 PM CDT -----  pls tell pt u/s looked fine  We will proceed w/ plans to treat HCV as discussed

## 2018-11-23 ENCOUNTER — EPISODE CHANGES (OUTPATIENT)
Dept: HEPATOLOGY | Facility: CLINIC | Age: 38
End: 2018-11-23

## 2018-11-29 ENCOUNTER — TELEPHONE (OUTPATIENT)
Dept: PHARMACY | Facility: CLINIC | Age: 38
End: 2018-11-29

## 2018-12-07 ENCOUNTER — EPISODE CHANGES (OUTPATIENT)
Dept: HEPATOLOGY | Facility: CLINIC | Age: 38
End: 2018-12-07

## 2019-01-03 ENCOUNTER — EPISODE CHANGES (OUTPATIENT)
Dept: HEPATOLOGY | Facility: CLINIC | Age: 39
End: 2019-01-03

## 2019-01-03 NOTE — TELEPHONE ENCOUNTER
FOR DOCUMENTATION ONLY:  Financial Assistance for Mavyret is approved from 12-31-18 to 6-29-19 (8 weeks)  Source [employer] Patient Assistance  Sending a staff message to Jennifer Scheuermann PA regarding approval

## 2019-01-17 ENCOUNTER — TELEPHONE (OUTPATIENT)
Dept: PHARMACY | Facility: CLINIC | Age: 39
End: 2019-01-17

## 2019-01-17 NOTE — TELEPHONE ENCOUNTER
Patient has not setup initial shipment for Mavyret with [employer].  Advised patient to contact [employer] and try to setup shipment and call [facility] once shipment has been setup.  Patient verbalized understanding.

## 2019-01-28 NOTE — TELEPHONE ENCOUNTER
Patient returned calls from pharmacy.  Name and  confirmed. Patient confirmed that initial shipment of Mavyret from [facility] is scheduled to arrive today, 19.  Patient plans on starting as soon as package arrives.     The following patient education was reinforced:    Initial Mavyret consult completed on . Patient will start Mavyret on .      Mavyret 100/40mg- Take three tablets by mouth once daily WITH FOOD x 8 weeks  Counseling was reviewed:   1. Patient MUST take Mavret at the SAME time every day.   2. Potential Side effects include: nausea, headaches, diarrhea and fatigue.   Headache: Patient may treat with OTC remedies. If Tylenol is used, dose should not exceed 2000mg per day.    4. Medication list reviewed. No DDIs or allergies noted. Patient MUST contact myself or provider prior to starting any new OTC, herbal, or prescription drugs to avoid potential DDIs.    DDI: Medication list reviewed and potential DDIs addressed.    Discussed the importance of staying well hydrated while on therapy. Compliance stressed - patient to take missed doses as soon as remembered, but NOT to take 2 doses in one day. Patient will report questions or concerns to myself or practitioner. Patient verbalizes understanding. Patient plans to start Mavyret on .  Consultation included: indication; goals of treatment; administration; storage and handling; side effects; how to handle side effects; the importance of compliance; how to handle missed doses; the importance of laboratory monitoring; the importance of keeping all follow up appointments.  Patient understands that all future pharmaceutical care relative to Mavyret will be provided by [facility].     Carlos Ball, JENNY.Ph., AA[facility]  Clinical Pharmacist, HIV/HCV  [facility]  Phone: [phone]

## 2019-01-29 ENCOUNTER — TELEPHONE (OUTPATIENT)
Dept: HEPATOLOGY | Facility: CLINIC | Age: 39
End: 2019-01-29

## 2019-01-29 DIAGNOSIS — B18.2 CHRONIC HEPATITIS C WITHOUT HEPATIC COMA: Primary | ICD-10-CM

## 2019-01-30 NOTE — TELEPHONE ENCOUNTER
Pt beginning 8 weeks of Mavyret on 1/28/19  Anu 1A, Tx naive  F0-1      Pls schedule:  - CMP, HCV RNA at week 4 - 2/25  - CMP, HCV RNA at week 8 - end of treatment - 3/25  - CMP, HCV RNA - SVR12 - 6/17

## 2019-01-31 ENCOUNTER — EPISODE CHANGES (OUTPATIENT)
Dept: HEPATOLOGY | Facility: CLINIC | Age: 39
End: 2019-01-31

## 2019-01-31 NOTE — TELEPHONE ENCOUNTER
Attempt made to reach patient.  Msg from PA Scheuermann left on his VM and mailed to him.  Labs scheduled; appt notices mailed.

## 2019-02-25 ENCOUNTER — LAB VISIT (OUTPATIENT)
Dept: LAB | Facility: HOSPITAL | Age: 39
End: 2019-02-25
Attending: PHYSICIAN ASSISTANT
Payer: MEDICAID

## 2019-02-25 DIAGNOSIS — B18.2 CHRONIC HEPATITIS C WITHOUT HEPATIC COMA: ICD-10-CM

## 2019-02-25 LAB
ALBUMIN SERPL BCP-MCNC: 4.2 G/DL
ALP SERPL-CCNC: 47 U/L
ALT SERPL W/O P-5'-P-CCNC: 16 U/L
ANION GAP SERPL CALC-SCNC: 6 MMOL/L
AST SERPL-CCNC: 22 U/L
BILIRUB SERPL-MCNC: 0.8 MG/DL
BUN SERPL-MCNC: 8 MG/DL
CALCIUM SERPL-MCNC: 10 MG/DL
CHLORIDE SERPL-SCNC: 105 MMOL/L
CO2 SERPL-SCNC: 28 MMOL/L
CREAT SERPL-MCNC: 0.9 MG/DL
EST. GFR  (AFRICAN AMERICAN): >60 ML/MIN/1.73 M^2
EST. GFR  (NON AFRICAN AMERICAN): >60 ML/MIN/1.73 M^2
GLUCOSE SERPL-MCNC: 91 MG/DL
POTASSIUM SERPL-SCNC: 3.8 MMOL/L
PROT SERPL-MCNC: 7.2 G/DL
SODIUM SERPL-SCNC: 139 MMOL/L

## 2019-02-25 PROCEDURE — 87522 HEPATITIS C REVRS TRNSCRPJ: CPT

## 2019-02-25 PROCEDURE — 36415 COLL VENOUS BLD VENIPUNCTURE: CPT

## 2019-02-25 PROCEDURE — 80053 COMPREHEN METABOLIC PANEL: CPT

## 2019-02-27 ENCOUNTER — TELEPHONE (OUTPATIENT)
Dept: HEPATOLOGY | Facility: CLINIC | Age: 39
End: 2019-02-27

## 2019-02-27 LAB
HCV RNA SERPL NAA+PROBE-LOG IU: 2.52 LOG (10) IU/ML
HCV RNA SERPL QL NAA+PROBE: DETECTED IU/ML
HCV RNA SPEC NAA+PROBE-ACNC: 331 IU/ML

## 2019-02-27 NOTE — TELEPHONE ENCOUNTER
HCV LAB REVIEW  Week 4 of Mavyret, planning on 8 weeks treatment  Anu 1A, Tx naive  F0-1    Pertinent labs:  2/25/19    CMP normal    Tried to call pt:  [phone] - number not in service  [phone] - left VM to call back    pls try to call pt:  Labs show HCV down from > 5 million to 331  This is a good drop in the virus but most of the time it is lower by now  Please make sure pt is not skipping any doses and that he is taking ALL 3 Mavyret pills together at the same time, EACH DAY WITH FOOD.      Next labs due  - CMP, HCV RNA at week 8 - end of treatment - 3/25  - CMP, HCV RNA - SVR12 - 6/17

## 2019-03-07 NOTE — TELEPHONE ENCOUNTER
Attempt made to reach pt unsuccessful. PA Scheuermann msg relayed to pt via mail. Labs already scheduled.

## 2019-03-22 ENCOUNTER — TELEPHONE (OUTPATIENT)
Dept: HEPATOLOGY | Facility: CLINIC | Age: 39
End: 2019-03-22

## 2019-03-22 NOTE — TELEPHONE ENCOUNTER
----- Message from Kayla Fabio sent at 3/22/2019  9:29 AM CDT -----  Contact: Patient  Needs Advice    Reason for call: Patient called to speak to nurse about finishing up his medications. He has 3 days left of Mavyret.         Communication Preference: [phone]    Additional Information: n/a

## 2019-03-22 NOTE — TELEPHONE ENCOUNTER
Spoke with pt. He stated he has a few days left of treatment left. I reminded him of his upcoming labs. Reminder notices mailed.

## 2019-03-25 ENCOUNTER — LAB VISIT (OUTPATIENT)
Dept: LAB | Facility: HOSPITAL | Age: 39
End: 2019-03-25
Attending: PHYSICIAN ASSISTANT
Payer: MEDICAID

## 2019-03-25 DIAGNOSIS — B18.2 CHRONIC HEPATITIS C WITHOUT HEPATIC COMA: ICD-10-CM

## 2019-03-25 LAB
ALBUMIN SERPL BCP-MCNC: 4.3 G/DL (ref 3.5–5.2)
ALP SERPL-CCNC: 48 U/L (ref 55–135)
ALT SERPL W/O P-5'-P-CCNC: 12 U/L (ref 10–44)
ANION GAP SERPL CALC-SCNC: 9 MMOL/L (ref 8–16)
AST SERPL-CCNC: 19 U/L (ref 10–40)
BILIRUB SERPL-MCNC: 0.7 MG/DL (ref 0.1–1)
BUN SERPL-MCNC: 11 MG/DL (ref 6–20)
CALCIUM SERPL-MCNC: 10 MG/DL (ref 8.7–10.5)
CHLORIDE SERPL-SCNC: 104 MMOL/L (ref 95–110)
CO2 SERPL-SCNC: 27 MMOL/L (ref 23–29)
CREAT SERPL-MCNC: 0.9 MG/DL (ref 0.5–1.4)
EST. GFR  (AFRICAN AMERICAN): >60 ML/MIN/1.73 M^2
EST. GFR  (NON AFRICAN AMERICAN): >60 ML/MIN/1.73 M^2
GLUCOSE SERPL-MCNC: 94 MG/DL (ref 70–110)
POTASSIUM SERPL-SCNC: 3.9 MMOL/L (ref 3.5–5.1)
PROT SERPL-MCNC: 7.4 G/DL (ref 6–8.4)
SODIUM SERPL-SCNC: 140 MMOL/L (ref 136–145)

## 2019-03-25 PROCEDURE — 80053 COMPREHEN METABOLIC PANEL: CPT

## 2019-03-25 PROCEDURE — 87522 HEPATITIS C REVRS TRNSCRPJ: CPT

## 2019-03-28 LAB
HCV RNA SERPL NAA+PROBE-LOG IU: 2.25 LOG (10) IU/ML
HCV RNA SERPL QL NAA+PROBE: DETECTED IU/ML
HCV RNA SPEC NAA+PROBE-ACNC: 177 IU/ML

## 2019-03-29 ENCOUNTER — TELEPHONE (OUTPATIENT)
Dept: HEPATOLOGY | Facility: CLINIC | Age: 39
End: 2019-03-29

## 2019-03-29 DIAGNOSIS — B18.2 CHRONIC HEPATITIS C WITHOUT HEPATIC COMA: Primary | ICD-10-CM

## 2019-03-29 NOTE — TELEPHONE ENCOUNTER
Spoke with pt PA Scheuermann msg relayed. Lab scheduled as instructed. Reminder notices mailed to pt.

## 2019-03-29 NOTE — TELEPHONE ENCOUNTER
HCV LAB REVIEW  Week 8 of Mavyret,  END OF TREATMENT  Aun 1A, Tx naive  F0-1    Pertinent labs:  3/25/19    CMP normal      pls notify pt:  1. Liver numbers normal. HCV is still registering as positive at 177.  2. Patient should be finished HCV treatment now.   Sometimes we do see the virus is still in the body at the end of treatment but then goes down to zero over the next several weeks. We need to monitor the blood for the next 12 weeks.      Next labs due  - HCV RNA - SVR4 - 4/22 - pls schedule  - CMP, HCV RNA - SVR12 - 6/17

## 2019-04-09 ENCOUNTER — TELEPHONE (OUTPATIENT)
Dept: HEPATOLOGY | Facility: CLINIC | Age: 39
End: 2019-04-09

## 2019-04-09 NOTE — TELEPHONE ENCOUNTER
----- Message from Jennifer B. Scheuermann, PA sent at 4/8/2019 12:32 PM CDT -----  Contact: Self  Pt's HCV Rx is complete  No mavyret refills are needed  pls notify whoever needs to know      ----- Message -----  From: Dayana Badillo MA  Sent: 4/8/2019  10:27 AM  To: Jennifer B. Scheuermann, PA        ----- Message -----  From: Terese Fleming  Sent: 4/8/2019  10:13 AM  To: [facility] Hepatitis C Staff    Rx Refill/Request     Is this a Refill or New Rx:  Refill  Rx Name and Strength:  MAVYRET  Preferred Pharmacy with phone number:   Communication Preference:[phone]  Additional Information:

## 2019-04-22 ENCOUNTER — LAB VISIT (OUTPATIENT)
Dept: LAB | Facility: HOSPITAL | Age: 39
End: 2019-04-22
Attending: PHYSICIAN ASSISTANT
Payer: MEDICAID

## 2019-04-22 DIAGNOSIS — B18.2 CHRONIC HEPATITIS C WITHOUT HEPATIC COMA: ICD-10-CM

## 2019-04-22 PROCEDURE — 87522 HEPATITIS C REVRS TRNSCRPJ: CPT

## 2019-04-22 PROCEDURE — 36415 COLL VENOUS BLD VENIPUNCTURE: CPT

## 2019-04-24 ENCOUNTER — TELEPHONE (OUTPATIENT)
Dept: HEPATOLOGY | Facility: CLINIC | Age: 39
End: 2019-04-24

## 2019-04-24 DIAGNOSIS — B18.2 CHRONIC HEPATITIS C WITHOUT HEPATIC COMA: Primary | ICD-10-CM

## 2019-04-24 LAB
HCV RNA SERPL NAA+PROBE-LOG IU: 1.48 LOG (10) IU/ML
HCV RNA SERPL QL NAA+PROBE: DETECTED IU/ML
HCV RNA SPEC NAA+PROBE-ACNC: 30 IU/ML

## 2019-04-25 NOTE — TELEPHONE ENCOUNTER
HCV LAB REVIEW  Completed 8 weeks of Mavyret,  3/25/19  Anu 1A, Tx naive  F0-1    Pertinent labs:  4/22  HCV 30      pls notify pt:  1. HCV has continued to drop, now down to 30. We will continue to monitor this      Next labs due  - HCV RNA - SVR8 - 5/20 - pls schedule  - CMP, HCV RNA - SVR12 - 6/17

## 2019-05-20 ENCOUNTER — LAB VISIT (OUTPATIENT)
Dept: LAB | Facility: HOSPITAL | Age: 39
End: 2019-05-20
Attending: PHYSICIAN ASSISTANT
Payer: MEDICAID

## 2019-05-20 DIAGNOSIS — B18.2 CHRONIC HEPATITIS C WITHOUT HEPATIC COMA: ICD-10-CM

## 2019-05-20 PROCEDURE — 36415 COLL VENOUS BLD VENIPUNCTURE: CPT

## 2019-05-20 PROCEDURE — 87522 HEPATITIS C REVRS TRNSCRPJ: CPT

## 2019-05-22 LAB
HCV RNA SERPL NAA+PROBE-LOG IU: <1.08 LOG (10) IU/ML
HCV RNA SERPL QL NAA+PROBE: DETECTED IU/ML
HCV RNA SPEC NAA+PROBE-ACNC: <12 IU/ML

## 2019-05-23 ENCOUNTER — TELEPHONE (OUTPATIENT)
Dept: HEPATOLOGY | Facility: CLINIC | Age: 39
End: 2019-05-23

## 2019-05-23 NOTE — TELEPHONE ENCOUNTER
HCV LAB REVIEW  Completed 8 weeks of Mavyret,  3/25/19  Anu 1A, Tx naive  F0-1    Pertinent labs:  5/20  HCV <12, detected (8 weeks post treatment)      pls notify pt:  1. HCV has continued to drop, now it is < 12, so low the lab can't count it  2. We will continue to monitor    Next labs due  - CMP, HCV RNA - SVR12 - 6/17

## 2019-06-17 ENCOUNTER — LAB VISIT (OUTPATIENT)
Dept: LAB | Facility: HOSPITAL | Age: 39
End: 2019-06-17
Attending: PHYSICIAN ASSISTANT
Payer: MEDICAID

## 2019-06-17 DIAGNOSIS — M62.9 DISORDER OF MUSCLE: Primary | ICD-10-CM

## 2019-06-17 DIAGNOSIS — B18.2 CHRONIC HEPATITIS C WITHOUT HEPATIC COMA: ICD-10-CM

## 2019-06-17 LAB
ALBUMIN SERPL BCP-MCNC: 4.3 G/DL (ref 3.5–5.2)
ALP SERPL-CCNC: 50 U/L (ref 55–135)
ALT SERPL W/O P-5'-P-CCNC: 13 U/L (ref 10–44)
ANION GAP SERPL CALC-SCNC: 8 MMOL/L (ref 8–16)
AST SERPL-CCNC: 21 U/L (ref 10–40)
BILIRUB SERPL-MCNC: 0.7 MG/DL (ref 0.1–1)
BUN SERPL-MCNC: 12 MG/DL (ref 6–20)
CALCIUM SERPL-MCNC: 9.9 MG/DL (ref 8.7–10.5)
CHLORIDE SERPL-SCNC: 102 MMOL/L (ref 95–110)
CO2 SERPL-SCNC: 28 MMOL/L (ref 23–29)
CREAT SERPL-MCNC: 0.9 MG/DL (ref 0.5–1.4)
EST. GFR  (AFRICAN AMERICAN): >60 ML/MIN/1.73 M^2
EST. GFR  (NON AFRICAN AMERICAN): >60 ML/MIN/1.73 M^2
GLUCOSE SERPL-MCNC: 75 MG/DL (ref 70–110)
POTASSIUM SERPL-SCNC: 4 MMOL/L (ref 3.5–5.1)
PROT SERPL-MCNC: 7.4 G/DL (ref 6–8.4)
SODIUM SERPL-SCNC: 138 MMOL/L (ref 136–145)

## 2019-06-17 PROCEDURE — 80053 COMPREHEN METABOLIC PANEL: CPT

## 2019-06-17 PROCEDURE — 87522 HEPATITIS C REVRS TRNSCRPJ: CPT

## 2019-06-19 LAB
HCV RNA SERPL NAA+PROBE-LOG IU: <1.08 LOG (10) IU/ML
HCV RNA SERPL QL NAA+PROBE: NOT DETECTED IU/ML
HCV RNA SPEC NAA+PROBE-ACNC: <12 IU/ML

## 2019-06-20 ENCOUNTER — EPISODE CHANGES (OUTPATIENT)
Dept: HEPATOLOGY | Facility: CLINIC | Age: 39
End: 2019-06-20

## 2019-06-20 ENCOUNTER — TELEPHONE (OUTPATIENT)
Dept: HEPATOLOGY | Facility: CLINIC | Age: 39
End: 2019-06-20

## 2019-06-20 DIAGNOSIS — Z86.19 HISTORY OF HEPATITIS C: ICD-10-CM

## 2019-06-20 PROBLEM — B19.20 HEPATITIS C VIRUS INFECTION: Status: RESOLVED | Noted: 2017-09-28 | Resolved: 2019-06-20

## 2019-06-20 NOTE — TELEPHONE ENCOUNTER
Pt labs scheduled as instructed. Reminder notices mailed to pt. PA Scheuermann msg relayed via mail.

## 2019-06-20 NOTE — TELEPHONE ENCOUNTER
HCV LAB REVIEW  Completed 8 weeks of Mavyret,  3/25/19  Anu 1A, Tx naive  F0-1    Pertinent labs:  6/17  HCV neg  CMP stable      These labs document SVR12 following successful HCV treatment with Mavyret      pls tell pt:  · HCV is negative still. This means HCV is cured!!  · We do not expect virus to return.  · This does not give protection from HCV and patient could be infected again if ever exposed to the virus again.    Please schedule   - HCV RNA - SVR12 - 9/17  - HCV RNA - 6/2020

## 2019-07-17 ENCOUNTER — OFFICE VISIT (OUTPATIENT)
Dept: FAMILY MEDICINE | Facility: HOSPITAL | Age: 39
End: 2019-07-17
Payer: MEDICAID

## 2019-07-17 VITALS
BODY MASS INDEX: 26.63 KG/M2 | HEIGHT: 65 IN | HEART RATE: 66 BPM | SYSTOLIC BLOOD PRESSURE: 99 MMHG | DIASTOLIC BLOOD PRESSURE: 61 MMHG | WEIGHT: 159.81 LBS

## 2019-07-17 DIAGNOSIS — Z13.828 ENCOUNTER FOR SCREENING FOR OTHER MUSCULOSKELETAL DISORDER: Primary | ICD-10-CM

## 2019-07-17 DIAGNOSIS — Z86.19 HISTORY OF HEPATITIS C: ICD-10-CM

## 2019-07-17 DIAGNOSIS — M22.2X2 PATELLOFEMORAL PAIN SYNDROME OF BOTH KNEES: ICD-10-CM

## 2019-07-17 DIAGNOSIS — M22.2X1 PATELLOFEMORAL PAIN SYNDROME OF BOTH KNEES: ICD-10-CM

## 2019-07-17 DIAGNOSIS — Z82.0 FAMILY HISTORY OF NEUROLOGICAL DISORDER: ICD-10-CM

## 2019-07-17 DIAGNOSIS — M62.9 DISORDER OF MUSCLE: ICD-10-CM

## 2019-07-17 PROCEDURE — 99213 OFFICE O/P EST LOW 20 MIN: CPT | Performed by: STUDENT IN AN ORGANIZED HEALTH CARE EDUCATION/TRAINING PROGRAM
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
## 2020-03-26 ENCOUNTER — HOSPITAL ENCOUNTER (EMERGENCY)
Facility: HOSPITAL | Age: 40
Discharge: HOME | End: 2020-03-26
Attending: EMERGENCY MEDICINE
Payer: MEDICAID

## 2020-03-26 VITALS
BODY MASS INDEX: 26.68 KG/M2 | TEMPERATURE: 99 F | DIASTOLIC BLOOD PRESSURE: 66 MMHG | WEIGHT: 170 LBS | RESPIRATION RATE: 16 BRPM | SYSTOLIC BLOOD PRESSURE: 121 MMHG | HEIGHT: 67 IN | OXYGEN SATURATION: 97 % | HEART RATE: 67 BPM
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
## 2020-03-26 PROCEDURE — 99282 EMERGENCY DEPT VISIT SF MDM: CPT

## 2020-03-26 NOTE — ED TRIAGE NOTES
Pt presents to ED with complaints of a dog bite he received by walking down the street to go to the store. Pt states the dog jumped and bit him in his right thigh. Pt states he has no other complaints at this time. No active bleeding noted to wound. Pt states he received a tetanus shot here within the last 5 years.     APPEARANCE: Alert, oriented and in no acute distress.  CARDIAC: Normal rate and rhythm  PERIPHERAL VASCULAR: peripheral pulses present. Normal cap refill. No edema. Warm to touch.    RESPIRATORY:Normal rate and effort. Respirations are equal and unlabored no obvious signs of distress.  GASTRO: soft, bowel sounds normal, no tenderness, no abdominal distention.  MUSC: Full ROM. No bony tenderness or soft tissue tenderness. No obvious deformity.  SKIN: Skin is warm and dry, normal skin turgor, mucous membranes moist. Scratch noted to upper posterior right thigh. No active bleeding, no puncture wound noted.  NEURO: 5/5 strength major flexors/extensors bilaterally. Sensory intact to light touch bilaterally. Rogerson coma scale: eyes open spontaneously-4, oriented & converses-5, obeys commands-6. No neurological abnormalities.   MENTAL STATUS: awake, alert and aware of environment.

## 2020-03-26 NOTE — DISCHARGE INSTRUCTIONS
Keep area clean and dry.  You can apply bacitracin or Neosporin twice a day as needed.    Our goal in the emergency department is to always give you outstanding care and exceptional service. You may receive a survey by mail or e-mail in the next week regarding your experience in our ED. We would greatly appreciate your completing and returning the survey. Your feedback provides us with a way to recognize our staff who give very good care and it helps us learn how to improve when your experience was below our aspiration of excellence.

## 2020-03-26 NOTE — ED PROVIDER NOTES
"Encounter Date: 3/26/2020       History     Chief Complaint   Patient presents with    Animal Bite     PT states about 30 mins prior to arrival he was bitten by a dog on his right thigh while walking from the store. PT is not aware if he had his tetnus shot. PT denies pain. PT is not in any immediate distress, just wants to make sure the dog was "clean"     Patient is a 39-year-old male with medical history of hepatitis C presenting to the ED for a dog bite.  Patient states dog was tied up next somebody's house and when he walked by he got scratch.  Patient states last tetanus was within the last 2 years.      The history is provided by the patient.     Review of patient's allergies indicates:  No Known Allergies  Past Medical History:   Diagnosis Date    History of hepatitis C, s/p successful Rx w/ SVR12 (cure) - 3/2019     s/p Mavyret     No past surgical history on file.  No family history on file.  Social History     Tobacco Use    Smoking status: Current Every Day Smoker     Packs/day: 0.10     Types: Cigarettes     Last attempt to quit: 2018     Years since quittin.7    Smokeless tobacco: Never Used   Substance Use Topics    Alcohol use: Yes     Frequency: 2-4 times a month    Drug use: Yes     Types: Marijuana     Review of Systems   Skin: Positive for wound ( Right thigh).   Allergic/Immunologic: Negative for immunocompromised state.   All other systems reviewed and are negative.      Physical Exam     Initial Vitals [20 1646]   BP Pulse Resp Temp SpO2   131/69 97 18 98.5 °F (36.9 °C) 97 %      MAP       --         Physical Exam    Nursing note and vitals reviewed.  Constitutional: Vital signs are normal. He appears well-developed and well-nourished. He is cooperative. No distress.   HENT:   Head: Normocephalic and atraumatic.   Mouth/Throat: Mucous membranes are normal.   Eyes: EOM and lids are normal. Pupils are equal, round, and reactive to light.   Neck: Trachea normal, normal range " of motion and phonation normal. Neck supple.   Cardiovascular: Normal rate, regular rhythm and intact distal pulses.   Pulses:       Radial pulses are 2+ on the right side, and 2+ on the left side.   Pulmonary/Chest: Effort normal and breath sounds normal.   Abdominal: Normal appearance.   Musculoskeletal: Normal range of motion.   Neurological: He is alert and oriented to person, place, and time. He has normal strength. No sensory deficit. He displays a negative Romberg sign. GCS eye subscore is 4. GCS verbal subscore is 5. GCS motor subscore is 6.   Skin: Skin is warm, dry and intact. Capillary refill takes 2 to 3 seconds. No abrasion, no laceration and no rash noted. No cyanosis. Nails show no clubbing.              ED Course   Procedures  Labs Reviewed - No data to display       Imaging Results    None          Medical Decision Making:   Initial Assessment:   Emergent evaluation of a 39-year-old male presenting to the ED for possible dog bite.  Patient states he was walking around somebody's house and the dog was changed up.  Patient states he was scratch by this dog.  Patient unsure of rabies status.  On exam patient is A&O x3.  Vital signs stable.  Not febrile and nontoxic appearing.  Small 2 cm in length abrasion noted to right outer thigh.  No tenderness to palpation noted.  No purulent drainage noted.  No surrounding erythema noted.  Strength 5/5 in all extremities.  Plus two DP noted.  Differential Diagnosis:   Differential diagnoses include but are not limited to abrasion, contusion, animal bite, cellulitis.      ED Management:  I do not feel labs or imaging are pertinent for the care this patient.  Wound clean by nursing staff.  Bacitracin applied.  Last tetanus was in 2016.  Patient advised to keep area clean and dry.  Apply Neosporin twice a day as needed.  Follow-up with PCP as needed.  Patient verbalized understanding of this plan of care.  All questions and concerns addressed.    Patient is  hemodynamically stable, vital signs are normal. Discharge instructions given. Return to ED precautions discussed. Follow up as directed. Pt verbalized understanding of this plan.  Pt is stable for discharge.                                  Clinical Impression:       ICD-10-CM ICD-9-CM   1. Dog bite, initial encounter W54.0XXA 879.8     E906.0         Disposition:   Disposition: Discharged  Condition: Stable     ED Disposition Condition    Discharge Stable        ED Prescriptions     None        Follow-up Information     Follow up With Specialties Details Why Contact Info    Luci Siegel MD Family Medicine Schedule an appointment as soon as possible for a visit  As needed [address] [id number] [phone]                                       Iris Pardo NP  03/26/20 1801

## 2025-01-11 ENCOUNTER — HOSPITAL ENCOUNTER (EMERGENCY)
Facility: HOSPITAL | Age: 45
Discharge: HOME | End: 2025-01-11
Attending: STUDENT IN AN ORGANIZED HEALTH CARE EDUCATION/TRAINING PROGRAM
Payer: MEDICAID

## 2025-01-11 VITALS
HEIGHT: 67 IN | DIASTOLIC BLOOD PRESSURE: 76 MMHG | WEIGHT: 175 LBS | TEMPERATURE: 99 F | OXYGEN SATURATION: 97 % | HEART RATE: 62 BPM | SYSTOLIC BLOOD PRESSURE: 150 MMHG | RESPIRATION RATE: 16 BRPM | BODY MASS INDEX: 27.47 KG/M2

## 2025-01-11 DIAGNOSIS — J10.1 INFLUENZA A: Primary | ICD-10-CM

## 2025-01-11 DIAGNOSIS — R50.9 FEVER AND CHILLS: ICD-10-CM

## 2025-01-11 LAB
INFLUENZA A, MOLECULAR: POSITIVE
INFLUENZA B, MOLECULAR: NEGATIVE
SARS-COV-2 RDRP RESP QL NAA+PROBE: NEGATIVE
SPECIMEN SOURCE: ABNORMAL

## 2025-01-11 PROCEDURE — 87635 SARS-COV-2 COVID-19 AMP PRB: CPT

## 2025-01-11 PROCEDURE — 25000003 PHARM REV CODE 250

## 2025-01-11 PROCEDURE — 87502 INFLUENZA DNA AMP PROBE: CPT

## 2025-01-11 PROCEDURE — 99283 EMERGENCY DEPT VISIT LOW MDM: CPT | Mod: 25

## 2025-01-11 RX ORDER — ACETAMINOPHEN 500 MG
1000 TABLET ORAL
Status: COMPLETED | OUTPATIENT
Start: 2025-01-11 | End: 2025-01-11

## 2025-01-11 RX ADMIN — ACETAMINOPHEN 1000 MG: 500 TABLET, FILM COATED ORAL at 07:01

## 2025-01-12 NOTE — DISCHARGE INSTRUCTIONS
Please continue to rotate between Motrin and Tylenol for fever control and body aches.  Influenza is a virus and must run its course.  This could take 5-10 days.  If your fever is uncontrollable or you began to experience vomiting, chest pain, shortness of breath please return to the emergency department.

## 2025-01-12 NOTE — ED PROVIDER NOTES
Encounter Date: 2025       History     Chief Complaint   Patient presents with    Fever    Influenza     Fever x several hours. 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  Reports that he is not having any symptoms aside from the fever.  Temp at home was 100.4.  Believes that he was exposed to the flu.  Denies nausea, vomiting, diarrhea, chest pain, shortness of breath, recent antibiotic use.        Review of patient's allergies indicates:  No Known Allergies  Past Medical History:   Diagnosis Date    History of hepatitis C, s/p successful Rx w/ SVR12 (cure) - 3/2019     s/p Mavyret     History reviewed. No pertinent surgical history.  No family history on file.  Social History     Tobacco Use    Smoking status: Every Day     Current packs/day: 0.00     Types: Cigarettes     Last attempt to quit: 2018     Years since quittin.5    Smokeless tobacco: Never   Substance Use Topics    Alcohol use: Yes    Drug use: Yes     Types: Marijuana     Review of Systems   Constitutional:  Positive for fever.   HENT: Negative.     Eyes: Negative.    Respiratory: Negative.     Cardiovascular: Negative.    Gastrointestinal: Negative.    Genitourinary: Negative.    Musculoskeletal: Negative.    Skin: Negative.    Neurological: Negative.        Physical Exam     Initial Vitals   BP Pulse Resp Temp SpO2   25 1847 25 1847 25 1847 25 1846 25 1847   (!) 155/70 70 16 (!) 101 °F (38.3 °C) 96 %      MAP       --                Physical Exam    Vitals reviewed.  Constitutional: He appears well-developed and well-nourished. He is not diaphoretic. No distress.   HENT:   Head: Atraumatic.   Nose: Nose normal. Mouth/Throat: Oropharynx is clear and moist.   Eyes: Conjunctivae and EOM are normal. Right eye exhibits no discharge. Left eye exhibits no discharge.    Neck:   Normal range of motion.  Cardiovascular:  Normal rate, regular rhythm, normal heart sounds and intact distal pulses.           Pulmonary/Chest: Breath sounds normal. No respiratory distress. He has no wheezes. He has no rhonchi. He has no rales.   Abdominal: Abdomen is soft. He exhibits no distension. There is no abdominal tenderness. There is no rebound and no guarding.   Musculoskeletal:         General: No edema. Normal range of motion.      Cervical back: Normal range of motion.     Lymphadenopathy:     He has cervical adenopathy.   Neurological: He is alert. GCS score is 15. GCS eye subscore is 4. GCS verbal subscore is 5. GCS motor subscore is 6.   Skin: Skin is warm. Capillary refill takes less than 2 seconds.         ED Course   Procedures  Labs Reviewed   INFLUENZA A AND B ANTIGEN - Abnormal       Result Value    Influenza A, Molecular Positive (*)     Influenza B, Molecular Negative      Flu A & B Source Nasal swab      Narrative:     Specimen Source->Nasopharyngeal Swab  Influenza A critical result(s) called and verbal readback obtained   from Angelica Giordano RN  by KS3 01/11/2025 20:28   SARS-COV-2 RNA AMPLIFICATION, QUAL    SARS-CoV-2 RNA, Amplification, Qual Negative            Imaging Results              X-Ray Chest PA And Lateral (Final result)  Result time 01/11/25 20:21:43      Final result by Ernie Decker MD (01/11/25 20:21:43)                   Impression:      No radiographic evidence of acute intrathoracic process.      Electronically signed by: Ernie Decker MD  Date:    01/11/2025  Time:    20:21               Narrative:    EXAMINATION:  XR CHEST PA AND LATERAL    CLINICAL HISTORY:  Fever, unspecified    TECHNIQUE:  PA and lateral views of the chest were performed.    COMPARISON:  05/04/2027    FINDINGS:  The cardiomediastinal silhouette appears within normal limits.  The lungs are symmetrically aerated without evidence of focal airspace consolidation.  There is no pleural  effusion or pneumothorax.  Visualized osseous structures appear intact.                                       Medications   acetaminophen tablet 1,000 mg (1,000 mg Oral Given 1/11/25 1903)     Medical Decision Making  44-year-old male past medical history of [place]'s presents to the emergency department for fever and generalized body aches that started this afternoon.     Considerations include but not limited to influenza, COVID, pneumonia, sepsis, other viral illness    Patient is febrile at 101° he is well-appearing on physical exam.  Resting comfortably in bed.  Nontoxic and in no acute distress.  Benign abdominal exam.  Vesicular breath sounds throughout.  Do not note any rashes or lesions to the body.  Oropharynx is clear and moist and without erythema.  X-ray is negative for any acute cardiopulmonary pathology.  He is influenza A positive.  Discussed symptomatic treatment at home and return precautions.  He verbalized his understanding of the plan and agreed.  Plan also discussed with my attending and all questions were answered at the bedside.    Amount and/or Complexity of Data Reviewed  Radiology: ordered.    Risk  OTC drugs.                                      Clinical Impression:  Final diagnoses:  [R50.9] Fever and chills  [J10.1] Influenza A (Primary)          ED Disposition Condition    Discharge Stable          ED Prescriptions    None       Follow-up Information       Follow up With Specialties Details Why Contact Info    Luci Siegel MD Family Medicine Call   [address]  [address]  [facility] [address] [phone]               Tracy Back PA-C  01/11/25 2872

## 2025-01-27 ENCOUNTER — PATIENT OUTREACH (OUTPATIENT)
Facility: OTHER | Age: 45
End: 2025-01-27
Payer: MEDICAID